# Patient Record
Sex: FEMALE | Race: WHITE
[De-identification: names, ages, dates, MRNs, and addresses within clinical notes are randomized per-mention and may not be internally consistent; named-entity substitution may affect disease eponyms.]

---

## 2018-05-01 LAB
APTT BLD: 25.1 SEC (ref 23.5–35.8)
ERYTHROCYTE [DISTWIDTH] IN BLOOD BY AUTOMATED COUNT: 14.2 % (ref 11.5–14)
HCT VFR BLD CALC: 42.6 % (ref 36–47)
HGB BLD-MCNC: 14.2 G/DL (ref 12–15.5)
INR PPP: 0.88
MCH RBC QN AUTO: 30.3 PG (ref 27–33.4)
MCHC RBC AUTO-ENTMCNC: 33.4 G/DL (ref 32–36)
MCV RBC AUTO: 91 FL (ref 80–97)
PLATELET # BLD: 384 10^3/UL (ref 150–450)
PROTHROMBIN TIME: 12.4 SEC (ref 11.4–15.4)
RBC # BLD AUTO: 4.7 10^6/UL (ref 3.72–5.28)
WBC # BLD AUTO: 10.3 10^3/UL (ref 4–10.5)

## 2018-05-15 ENCOUNTER — HOSPITAL ENCOUNTER (OUTPATIENT)
Dept: HOSPITAL 62 - OROUT | Age: 63
Discharge: HOME | End: 2018-05-15
Attending: PLASTIC SURGERY
Payer: COMMERCIAL

## 2018-05-15 VITALS — SYSTOLIC BLOOD PRESSURE: 141 MMHG | DIASTOLIC BLOOD PRESSURE: 77 MMHG

## 2018-05-15 DIAGNOSIS — C44.619: Primary | ICD-10-CM

## 2018-05-15 DIAGNOSIS — Z88.6: ICD-10-CM

## 2018-05-15 DIAGNOSIS — Z88.2: ICD-10-CM

## 2018-05-15 DIAGNOSIS — Z79.891: ICD-10-CM

## 2018-05-15 DIAGNOSIS — Z79.899: ICD-10-CM

## 2018-05-15 DIAGNOSIS — Z88.8: ICD-10-CM

## 2018-05-15 PROCEDURE — 88305 TISSUE EXAM BY PATHOLOGIST: CPT

## 2018-05-15 PROCEDURE — 84132 ASSAY OF SERUM POTASSIUM: CPT

## 2018-05-15 PROCEDURE — 93010 ELECTROCARDIOGRAM REPORT: CPT

## 2018-05-15 PROCEDURE — 93005 ELECTROCARDIOGRAM TRACING: CPT

## 2018-05-15 PROCEDURE — 36415 COLL VENOUS BLD VENIPUNCTURE: CPT

## 2018-05-15 PROCEDURE — 85610 PROTHROMBIN TIME: CPT

## 2018-05-15 PROCEDURE — 85730 THROMBOPLASTIN TIME PARTIAL: CPT

## 2018-05-15 PROCEDURE — 88331 PATH CONSLTJ SURG 1 BLK 1SPC: CPT

## 2018-05-15 PROCEDURE — 14020 TIS TRNFR S/A/L 10 SQ CM/<: CPT

## 2018-05-15 PROCEDURE — 85027 COMPLETE CBC AUTOMATED: CPT

## 2018-05-15 NOTE — DISCHARGE SUMMARY
Discharge Summary (SDC)





- Discharge


Final Diagnosis: 





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


Date of Surgery: 05/15/18


Condition: Good


Treatment or Instructions: 





























Leave the top dressing on for 2 days, then removed.





Leave the steri-strip tapes on for 5 days, then removal.





Then cleaning wound with peroxide and apply Neosporin/bacitracin 3 times per 

day.





Antibiotics for 1 day, then discontinue.





Elevate operative area to decrease swelling.





Do not strain, or lift heavy objects.





Call for excessive bleeding, increased temperature of 101, uncontrolled pain, 

or excessive nausea or vomiting.


You may reach Dr. Guevara through his office at [phone].


In the event of an emergency after hours, then contact Dr. Guevara through [facility].





Return to the office for a postop check on Thursday.  The time will be 

scheduled by the nursing staff  of [facility] prior to discharge.








Please give the patient a copy of their labs and EKG so they can bring this to 

their PMD.


Thank you





Portions of this note may be dictated using Dragon voice recognition software.


Occasional variations and spelling and vocabulary could be possible and are 

unintentional.


Additionally, there is a chance that some errors may not be caught or corrected.


Please notify the offer of any discrepancies noted or if any statements are 

unclear.





Referrals: 


BONNIE GOOD NP [Primary Care Provider] - 


Discharge Diet: As Tolerated


Report the Following to Your Physician Immediately: Unusual Bleeding

## 2018-05-15 NOTE — OPERATIVE REPORT
Operative Report


DATE OF SURGERY: 05/15/18


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 

positive peripheral and very close deep margin


POSTOPERATIVE DIAGNOSIS: Same


OPERATION: excision of basal cell carcinoma from the right trapezius with 

frozen section margin control and reconstruction with a boomerang sliding 

advancement flap reconstruction.


SURGEON: ARUN CUNNINGHAM


ANESTHESIA: LMAC


TISSUE REMOVED OR ALTERED: Basal cell carcinoma removed


COMPLICATIONS: 





None


ESTIMATED BLOOD LOSS: Minimal


PROCEDURE: 





Patient seen and was marked prior to being brought into the operating room.


Patient was brought into the operating room and placed on the operating room 

table in a sloppy lateral position.


Patient was then prepped with a Betadine scrub and Betadine solution and draped 

in a sterile and aseptic manner.


The area was then marked.


12 O'clock was marked towards the neck      


3 O'clock was marked towards the shoulder


 6:00 was marked towards the scapula


9:00 was marked towards the midline back


The area was then anesthetized with 1% lidocaine with epinephrine and 

bicarbonate for its anesthetic and hemostatic effects.


The area was then excised and marked at 12:00.


The specimen was sent for frozen section.


The results came back that the deep and lateral margins were free.


We had considered a primary closure but this would go against the natural 

relaxed skin tension lines. 


A primary closure would be too tight and would have increased chance of 

dehiscence.


This will leave more of a scar so we decided to use a boomerang sliding 

advancement flap reconstruction which would camouflage the scar better and take 

tension off of the closure so that would be less chances of complications.


We decided to go with a boomerang flap because the actual direction of the scar 

would fall best into her natural lines around the shoulder and base of neck.  A 

straight scar would have left more of a obvious scar that would be more 

noticeable.


A curvilinear reconstruction with a boomerang flap will give her the best 

results.


Then we went ahead and outlined the flap and anesthetized it.


We then incised the flap and developed a flap maintaining the subdermal plexus.


Then we undermined 360 to allow for plate like scarring and minimize trap door 

deformity.


Throughout the case hemostasis was achieved with the bipolar.


We then sutured the flap into its new position using 3-0 Vicryl for the 

subcutaneous and deep dermis.


Skin was closed with a [running subcuticular suture] stitch using [4-0 PDS] 

with knots being tied on the outside.


And [4-0 PDS] suture was used for support and placed in the central area of the 

incision.


 We then applied tincture benzoin and Steri-Strips followed by a light pressure 

dressing.


Patient was then reversed from anesthesia and taken to the [facility] for recovery. 


The patient tolerated well.  


There were no complications.  


Lesion size was approximately 1.2 x 1.3 cm please see pathology for actual size.





Portions of this note may be dictated using Dragon voice recognition software.


Occasional variations and spelling and vocabulary could be possible and are 

unintentional.


Additionally, there is a chance that some errors may not be caught or corrected.


Please notify the author of any discrepancies noted or if any statements are 

unclear.








Subjective: No complaints


Objective:   Vital signs stable afebrile


                   No bleeding


                   Dressing intact


Assessment and plan:


                   Doing well.


                   Elevate the operative site.


                   Resume medications.  


                   Take antibiotics for 1 day


                   Follow-up Thursday


                   Full instructions were given to the patient and family and 

they understand





Portions of this note may be dictated using Dragon voice recognition software.


Occasional variations and spelling and vocabulary could be possible and are 

unintentional.


Additionally, there is a chance that some errors may not be caught or corrected.


Please notify the offer of any discrepancies noted or if any statements are 

unclear.

## 2018-08-16 ENCOUNTER — HOSPITAL ENCOUNTER (OUTPATIENT)
Dept: HOSPITAL 62 - RAD | Age: 63
End: 2018-08-16
Attending: FAMILY MEDICINE
Payer: COMMERCIAL

## 2018-08-16 DIAGNOSIS — R13.10: Primary | ICD-10-CM

## 2018-08-16 DIAGNOSIS — R05: ICD-10-CM

## 2018-08-16 PROCEDURE — 74230 X-RAY XM SWLNG FUNCJ C+: CPT

## 2018-08-16 PROCEDURE — 92611 MOTION FLUOROSCOPY/SWALLOW: CPT

## 2018-08-16 NOTE — RADIOLOGY REPORT (SQ)
EXAM DESCRIPTION:  COOKIE SWALLOW



COMPLETED DATE/TIME:  8/16/2018 8:57 am



REASON FOR STUDY:  COUGH (R05), DYSPHAGIA (R13.10) R05  COUGH R13.10  DYSPHAGIA, UNSPECIFIED



COMPARISON:  None.



TECHNIQUE:  Videofluoroscopic swallowing examination was performed in conjunction with speech patholo
gy.  Videofluoroscopic imaging was obtained and reviewed and these are the findings:



RADIATION DOSE:  Fluoro time 2.14 minutes

1 images saved to PACS.



LIMITATIONS:  None



FINDINGS:  The patient was brought into the fluoro room and placed upright on a modified barium swall
ow chair.  The patient was then given multiple consistencies mixed with barium to swallow under live 
fluoroscopic video guidance.  According to the Speech Pathologist there was no penetration or aspirat
ion. Please refer to the speech pathology refer for further details.



IMPRESSION:  NO EVIDENCE OF PENETRATION OR ASPIRATIONPLEASE SEE SPEECH PATHOLOGIST REPORT FOR OTHER F
INDINGS AND RECOMMENDATIONS.



COMMENT:  None

Quality :  Final reports for procedures using fluoroscopy that document radiation exposure ashleigh
matt, or exposure time and number of fluorographic images (if radiation exposure indices are not avail
able)



TECHNICAL DOCUMENTATION:  JOB ID: [id number]

 2011 [employer]- All Rights Reserved



Reading location - IP/workstation name: [place] [id number]